# Patient Record
Sex: MALE | Race: WHITE | Employment: UNEMPLOYED | URBAN - METROPOLITAN AREA
[De-identification: names, ages, dates, MRNs, and addresses within clinical notes are randomized per-mention and may not be internally consistent; named-entity substitution may affect disease eponyms.]

---

## 2023-07-06 ENCOUNTER — OFFICE VISIT (OUTPATIENT)
Dept: PEDIATRICS | Facility: CLINIC | Age: 18
End: 2023-07-06
Payer: COMMERCIAL

## 2023-07-06 VITALS — BODY MASS INDEX: 21.32 KG/M2 | TEMPERATURE: 98 F | WEIGHT: 157.2 LBS

## 2023-07-06 DIAGNOSIS — N50.3 EPIDIDYMAL CYST: Primary | ICD-10-CM

## 2023-07-06 PROBLEM — B99.9 RECURRENT INFECTIONS: Status: ACTIVE | Noted: 2023-07-06

## 2023-07-06 PROCEDURE — 1036F TOBACCO NON-USER: CPT | Performed by: PEDIATRICS

## 2023-07-06 PROCEDURE — 99213 OFFICE O/P EST LOW 20 MIN: CPT | Performed by: PEDIATRICS

## 2023-07-06 NOTE — PROGRESS NOTES
Subjective   Patient ID: Arvin Ferrara is a 17 y.o. male who presents for Cyst (ABOVE TESTICLE THAT HAS BEEN THERE FOR AWHILE BUT IN LAST 3-4 DAYS HAS CAUSED SOME PAIN AND DISCOMFORT).  Today he is accompanied by accompanied by father.     Nearly 18-year-old boy in the office this evening with pain in the right hemiscrotum that appears to be associated with the previously identified epididymal cyst.  Patient also has a history of a grade 1 left varicocele.  There may have been an incident several days ago before the pain began where he was struck in the groin by a bounced tennis ball.  If that was the source of the difficulty the pain did not develop right away.  Now he is having discomfort particularly when he moves his leg in different positions in the upper part of the right hemiscrotum where the cyst is.  He also thinks the cyst may be a bit larger than its been in the past.  He expressed concern about this being a sign of cancer and/or negatively impacting his fertility.        Review of Systems    Objective   Temp 36.7 °C (98 °F) (Temporal)   Wt 71.3 kg (157 lb 3.2 oz)   BMI 21.32 kg/m²   BSA: 1.9 meters squared  Growth percentiles: No height on file for this encounter. 53 %ile (Z= 0.08) based on CDC (Boys, 2-20 Years) weight-for-age data using vitals from 7/6/2023.     Physical Exam  Genitourinary:     Penis: Normal.       Testes: Normal.      Comments: Examination of the right hemiscrotum reveals a 1.3 cm x 1 cm slightly tender well-circumscribed mobile mass in the upper part of the scrotum.  The overlying skin is normal.  The adjacent testis is normal.  He does have a grade 1 out of 3 left varicocele that is nontender on the left.        Assessment/Plan Arvin was in the office this evening with new onset of pain and tenderness of a pre-existing epididymal cyst.  There may have been some recent trauma leading to this discomfort.  On exam the cyst feels about 1-1/2 times the size of my previous measurement.   Because of time constraints related to return to school I recommend that the family reach out to one of our local urologist.  There is a very good practice [facility] [place] of [facility].  I recommend they contact that office to see about getting him in before he heads off to school.  In the meantime he can treat the discomfort with Tylenol or Motrin.  I recommend supportive undergarments when he is active during the day and use of boxers when he is sleeping at night.  Problem List Items Addressed This Visit    None  Visit Diagnoses       Epididymal cyst    -  Primary

## 2023-07-06 NOTE — PATIENT INSTRUCTIONS
Arvin was in the office this evening with new onset of pain and tenderness of a pre-existing epididymal cyst.  There may have been some recent trauma leading to this discomfort.  On exam the cyst feels about 1-1/2 times the size of my previous measurement.  Because of time constraints related to return to school I recommend that the family reach out to one of our local urologist.  There is a very good practice called [facility] on the [place] side [facility].  I recommend they contact that office to see about getting him in before he heads off to school.  In the meantime he can treat the discomfort with Tylenol or Motrin.  I recommend supportive undergarments when he is active during the day and use of boxers when he is sleeping at night.

## 2023-07-11 ENCOUNTER — OFFICE VISIT (OUTPATIENT)
Dept: PEDIATRICS | Facility: CLINIC | Age: 18
End: 2023-07-11
Payer: COMMERCIAL

## 2023-07-11 VITALS
HEIGHT: 72 IN | SYSTOLIC BLOOD PRESSURE: 118 MMHG | DIASTOLIC BLOOD PRESSURE: 68 MMHG | WEIGHT: 161.2 LBS | BODY MASS INDEX: 21.83 KG/M2

## 2023-07-11 DIAGNOSIS — M25.449 SWELLING OF FINGER JOINT, UNSPECIFIED LATERALITY: ICD-10-CM

## 2023-07-11 DIAGNOSIS — N50.3 EPIDIDYMAL CYST: ICD-10-CM

## 2023-07-11 DIAGNOSIS — Z23 IMMUNIZATION DUE: ICD-10-CM

## 2023-07-11 DIAGNOSIS — Z00.129 ENCOUNTER FOR ROUTINE CHILD HEALTH EXAMINATION WITHOUT ABNORMAL FINDINGS: Primary | ICD-10-CM

## 2023-07-11 PROCEDURE — 1036F TOBACCO NON-USER: CPT | Performed by: PEDIATRICS

## 2023-07-11 PROCEDURE — 96127 BRIEF EMOTIONAL/BEHAV ASSMT: CPT | Performed by: PEDIATRICS

## 2023-07-11 PROCEDURE — 3008F BODY MASS INDEX DOCD: CPT | Performed by: PEDIATRICS

## 2023-07-11 PROCEDURE — 99395 PREV VISIT EST AGE 18-39: CPT | Performed by: PEDIATRICS

## 2023-07-11 RX ORDER — LORATADINE 10 MG/1
10 TABLET ORAL DAILY
COMMUNITY

## 2023-07-11 SDOH — SOCIAL STABILITY: SOCIAL INSECURITY: CHRONIC STRESS AT HOME: 0

## 2023-07-11 SDOH — SOCIAL STABILITY: SOCIAL INSECURITY: CAREGIVER MARITAL DISCORD: 0

## 2023-07-11 ASSESSMENT — PATIENT HEALTH QUESTIONNAIRE - PHQ9
9. THOUGHTS THAT YOU WOULD BE BETTER OFF DEAD, OR OF HURTING YOURSELF: NOT AT ALL
5. POOR APPETITE OR OVEREATING: NOT AT ALL
6. FEELING BAD ABOUT YOURSELF - OR THAT YOU ARE A FAILURE OR HAVE LET YOURSELF OR YOUR FAMILY DOWN: NOT AT ALL
4. FEELING TIRED OR HAVING LITTLE ENERGY: SEVERAL DAYS
1. LITTLE INTEREST OR PLEASURE IN DOING THINGS: NOT AT ALL
8. MOVING OR SPEAKING SO SLOWLY THAT OTHER PEOPLE COULD HAVE NOTICED. OR THE OPPOSITE, BEING SO FIGETY OR RESTLESS THAT YOU HAVE BEEN MOVING AROUND A LOT MORE THAN USUAL: NOT AT ALL
3. TROUBLE FALLING OR STAYING ASLEEP: SEVERAL DAYS
SUM OF ALL RESPONSES TO PHQ QUESTIONS 1-9: 4
SUM OF ALL RESPONSES TO PHQ9 QUESTIONS 1 & 2: 1
7. TROUBLE CONCENTRATING ON THINGS, SUCH AS READING THE NEWSPAPER OR WATCHING TELEVISION: SEVERAL DAYS
2. FEELING DOWN, DEPRESSED OR HOPELESS: SEVERAL DAYS

## 2023-07-11 ASSESSMENT — ENCOUNTER SYMPTOMS: CONSTIPATION: 0

## 2023-07-11 NOTE — PATIENT INSTRUCTIONS
Arvin was in the office today for his annual checkup.  I saw him last week for what I believed to be a traumatic enlargement of a right sided epididymal cyst that now is a bit improved today.  Also noted on physical exam today is his left varicocele which seems stable over the past several years.  He has some soft tissue swelling in his left forearm that apparently is a reconfiguration of muscles in his forearm related to his fracture from several years ago.  Arvin also mentioned some intermittent small joint swelling of his hands.  Fortunately these episodes are short-lived.  I suspect it is either trauma induced or some other innocent cause.  Because of his age and because of the symptoms I am ordering some lab work to screen for inflammation related to his joints and to screen for lipids related to routine health care.  He needs no shots today.  We discussed transitioning from our office to his next doctor over the course of the next year.  I am happy to remain his primary provider at least until the end of this calendar year.

## 2023-07-11 NOTE — PROGRESS NOTES
Subjective   History was provided by the father.  Arvin Ferrara is a 17 y.o. male who is here for this well child visit.  No new concerns today.  I saw the patient last week when he presented with a painful swelling of a previously identified cyst in his right hemiscrotum.  He reports that he is having less symptoms now.  The family did reach out to urology last week after my visit but as yet do not have a scheduled appointment as there was some confusion about ordering testing before the visit.  Dad plans to reach out to that office again to clarify.  Immunization History   Administered Date(s) Administered    DTaP 2003, 02/03/2004, 04/22/2004, 04/05/2005, 10/07/2008    HPV 9-Valent 11/25/2015, 04/05/2016    HPV, Quadrivalent 09/24/2015    Hep A, ped/adol, 2 dose 07/12/2018, 06/27/2019    Hep B, Adolescent or Pediatric 2003, 2003, 12/14/2004    Hib (PRP-OMP) 2003, 02/03/2004, 12/14/2004    IPV 2003, 02/03/2004, 09/28/2004, 10/07/2008    Influenza, Unspecified 10/17/2009, 10/14/2010, 08/29/2011, 10/27/2012, 11/09/2018    Influenza, injectable, MDCK, preservative free, quadrivalent 12/13/2021    Influenza, injectable, quadrivalent 10/27/2019    Influenza, injectable, quadrivalent, preservative free 08/23/2016, 10/02/2020    Influenza, live, intranasal, quadrivalent 10/17/2014, 09/24/2015    Influenza, seasonal, injectable 12/21/2017, 10/08/2022    MMR 09/28/2004, 09/20/2005    Meningococcal B, Omv 07/26/2022, 11/25/2022    Meningococcal MCV4O 06/25/2020    Meningococcal MCV4P 10/17/2014    Novel influenza-H1N1-09, preservative-free 12/13/2009    Pfizer Purple Cap SARS-CoV-2 03/31/2021, 04/21/2021, 12/13/2021    Pfizer Sars-cov-2 Bivalent 30 mcg/0.3 mL 12/12/2022    Pneumococcal Conjugate PCV 7 2003, 02/03/2004, 09/28/2004, 12/14/2004    Tdap 10/17/2014    Varicella 04/05/2005, 08/21/2007     History of previous adverse reactions to immunizations? no  The following portions of the  patient's history were reviewed by a provider in this encounter and updated as appropriate:       Well Child Assessment:  History was provided by the father. Arvin lives with his father, mother and brother. Interval problems do not include chronic stress at home or marital discord.   Nutrition  Types of intake include cereals, cow's milk, eggs, fish, fruits, meats and vegetables.   Dental  The patient has a dental home. The patient brushes teeth regularly.   Elimination  Elimination problems do not include constipation.   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  He is doing well.  He will likely live in a duplex with his brother and a friend.   Social  The caregiver enjoys the child. After school, the child is at home with a parent.       Objective   Vitals:    07/11/23 1439   BP: 118/68   Weight: 73.1 kg (161 lb 3.2 oz)   Height: 1.829 m (6')     Growth parameters are noted and are appropriate for age.  Physical Exam  Vitals reviewed.   Constitutional:       General: He is not in acute distress.     Appearance: Normal appearance. He is well-developed. He is not ill-appearing.   HENT:      Head: Normocephalic and atraumatic.      Right Ear: Tympanic membrane, ear canal and external ear normal.      Left Ear: Tympanic membrane, ear canal and external ear normal.      Nose: Nose normal.      Mouth/Throat:      Mouth: Mucous membranes are moist.      Pharynx: Oropharynx is clear. No oropharyngeal exudate or posterior oropharyngeal erythema.   Eyes:      General: No scleral icterus.     Extraocular Movements: Extraocular movements intact.      Conjunctiva/sclera: Conjunctivae normal.      Pupils: Pupils are equal, round, and reactive to light.      Comments: Wears contacts.  Discs are sharp.   Neck:      Thyroid: No thyromegaly.      Vascular: No JVD.   Cardiovascular:      Rate and Rhythm: Normal rate and regular rhythm.      Heart sounds: Normal heart sounds.  No murmur heard.  Pulmonary:      Effort: Pulmonary effort is normal. No respiratory distress.      Breath sounds: Normal breath sounds.   Abdominal:      General: Bowel sounds are normal. There is no distension.      Palpations: Abdomen is soft. There is no mass.      Tenderness: There is no abdominal tenderness.      Hernia: No hernia is present.   Genitourinary:     Penis: Normal.       Testes: Normal.      Comments: When compared to last week the lesion in the right hemiscrotum above the testis is now on the order of about 1 cm in length.  It is not tender.  The overlying skin is normal.  He has a grade 1 varicocele on the left as he has had in the past.  No other new lesions.  Musculoskeletal:         General: No swelling or deformity. Normal range of motion.      Cervical back: Normal range of motion and neck supple.      Comments: NO SCOLIOSIS   Lymphadenopathy:      Cervical: No cervical adenopathy.   Skin:     General: Skin is warm and dry.      Findings: No rash.   Neurological:      General: No focal deficit present.      Mental Status: He is alert and oriented to person, place, and time.      Cranial Nerves: No cranial nerve deficit.      Gait: Gait normal.   Psychiatric:         Mood and Affect: Mood normal.         Behavior: Behavior normal.         Assessment/Plan   Well adolescent.Arvin was in the office today for his annual checkup.  I saw him last week for what I believed to be a traumatic enlargement of a right sided epididymal cyst that now is a bit improved today.  Also noted on physical exam today is his left varicocele which seems stable over the past several years.  He has some soft tissue swelling in his left forearm that apparently is a reconfiguration of muscles in his forearm related to his fracture from several years ago.  Arvin also mentioned some intermittent small joint swelling of his hands.  Fortunately these episodes are short-lived.  I suspect it is either trauma induced or some other  innocent cause.  Because of his age and because of the symptoms I am ordering some lab work to screen for inflammation related to his joints and to screen for lipids related to routine health care.  He needs no shots today.  We discussed transitioning from our office to his next doctor over the course of the next year.  I am happy to remain his primary provider at least until the end of this calendar year.  1. Anticipatory guidance discussed.  Gave handout on well-child issues at this age..  3. Development: appropriate for age  4. No orders of the defined types were placed in this encounter.    5. Follow-up visit in 1 year for next well child visit, or sooner as needed.

## 2023-07-11 NOTE — PROGRESS NOTES
The patient has a girlfriend.  They have been dating for over a year.  They are sexually active.  They use condoms and birth control pills for pregnancy prevention.  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.  Patient denies use of marijuana, tobacco, alcohol or other drugs.

## 2023-07-13 ENCOUNTER — LAB (OUTPATIENT)
Dept: LAB | Facility: LAB | Age: 18
End: 2023-07-13
Payer: COMMERCIAL

## 2023-07-13 DIAGNOSIS — Z00.129 ENCOUNTER FOR ROUTINE CHILD HEALTH EXAMINATION WITHOUT ABNORMAL FINDINGS: ICD-10-CM

## 2023-07-13 DIAGNOSIS — M25.449 SWELLING OF FINGER JOINT, UNSPECIFIED LATERALITY: ICD-10-CM

## 2023-07-13 LAB
BASOPHILS (10*3/UL) IN BLOOD BY AUTOMATED COUNT: 0.05 X10E9/L (ref 0–0.1)
BASOPHILS/100 LEUKOCYTES IN BLOOD BY AUTOMATED COUNT: 0.9 % (ref 0–2)
C REACTIVE PROTEIN (MG/L) IN SER/PLAS: 0.13 MG/DL
CHOLESTEROL (MG/DL) IN SER/PLAS: 113 MG/DL (ref 0–199)
CHOLESTEROL IN HDL (MG/DL) IN SER/PLAS: 44.9 MG/DL
CHOLESTEROL/HDL RATIO: 2.5
EOSINOPHILS (10*3/UL) IN BLOOD BY AUTOMATED COUNT: 0.31 X10E9/L (ref 0–0.7)
EOSINOPHILS/100 LEUKOCYTES IN BLOOD BY AUTOMATED COUNT: 5.6 % (ref 0–6)
ERYTHROCYTE DISTRIBUTION WIDTH (RATIO) BY AUTOMATED COUNT: 12.5 % (ref 11.5–14.5)
ERYTHROCYTE MEAN CORPUSCULAR HEMOGLOBIN CONCENTRATION (G/DL) BY AUTOMATED: 33.6 G/DL (ref 32–36)
ERYTHROCYTE MEAN CORPUSCULAR VOLUME (FL) BY AUTOMATED COUNT: 89 FL (ref 80–100)
ERYTHROCYTES (10*6/UL) IN BLOOD BY AUTOMATED COUNT: 5.54 X10E12/L (ref 4.5–5.9)
HEMATOCRIT (%) IN BLOOD BY AUTOMATED COUNT: 49.1 % (ref 41–52)
HEMOGLOBIN (G/DL) IN BLOOD: 16.5 G/DL (ref 13.5–17.5)
IMMATURE GRANULOCYTES/100 LEUKOCYTES IN BLOOD BY AUTOMATED COUNT: 0.2 % (ref 0–0.9)
LDL: 42 MG/DL (ref 0–109)
LEUKOCYTES (10*3/UL) IN BLOOD BY AUTOMATED COUNT: 5.5 X10E9/L (ref 4.4–11.3)
LYMPHOCYTES (10*3/UL) IN BLOOD BY AUTOMATED COUNT: 1.82 X10E9/L (ref 1.2–4.8)
LYMPHOCYTES/100 LEUKOCYTES IN BLOOD BY AUTOMATED COUNT: 33.2 % (ref 13–44)
MONOCYTES (10*3/UL) IN BLOOD BY AUTOMATED COUNT: 0.41 X10E9/L (ref 0.1–1)
MONOCYTES/100 LEUKOCYTES IN BLOOD BY AUTOMATED COUNT: 7.5 % (ref 2–10)
NEUTROPHILS (10*3/UL) IN BLOOD BY AUTOMATED COUNT: 2.89 X10E9/L (ref 1.2–7.7)
NEUTROPHILS/100 LEUKOCYTES IN BLOOD BY AUTOMATED COUNT: 52.6 % (ref 40–80)
NON HDL CHOLESTEROL: 68 MG/DL (ref 0–119)
PLATELETS (10*3/UL) IN BLOOD AUTOMATED COUNT: 257 X10E9/L (ref 150–450)
TRIGLYCERIDE (MG/DL) IN SER/PLAS: 131 MG/DL (ref 0–149)
VLDL: 26 MG/DL (ref 0–40)

## 2023-07-13 PROCEDURE — 86140 C-REACTIVE PROTEIN: CPT

## 2023-07-13 PROCEDURE — 36415 COLL VENOUS BLD VENIPUNCTURE: CPT

## 2023-07-13 PROCEDURE — 80061 LIPID PANEL: CPT

## 2023-07-13 PROCEDURE — 85025 COMPLETE CBC W/AUTO DIFF WBC: CPT

## 2023-07-14 ENCOUNTER — TELEPHONE (OUTPATIENT)
Dept: PEDIATRICS | Facility: CLINIC | Age: 18
End: 2023-07-14
Payer: COMMERCIAL

## 2023-07-14 NOTE — TELEPHONE ENCOUNTER
I called and spoke to the patient to let him know his labs are normal.  No further studies needed at this time.

## 2024-12-19 ENCOUNTER — APPOINTMENT (OUTPATIENT)
Dept: PRIMARY CARE | Facility: CLINIC | Age: 19
End: 2024-12-19
Payer: COMMERCIAL

## 2024-12-19 VITALS
BODY MASS INDEX: 22.35 KG/M2 | OXYGEN SATURATION: 98 % | SYSTOLIC BLOOD PRESSURE: 109 MMHG | WEIGHT: 165 LBS | TEMPERATURE: 97.3 F | HEART RATE: 58 BPM | HEIGHT: 72 IN | RESPIRATION RATE: 18 BRPM | DIASTOLIC BLOOD PRESSURE: 69 MMHG

## 2024-12-19 DIAGNOSIS — N43.40 SPERMATOCELE OF EPIDIDYMIS: ICD-10-CM

## 2024-12-19 DIAGNOSIS — Z23 NEED FOR DIPHTHERIA-TETANUS-PERTUSSIS (TDAP) VACCINE: ICD-10-CM

## 2024-12-19 DIAGNOSIS — Z83.72 FAMILY HISTORY OF FAMILIAL ADENOMATOUS POLYPOSIS: ICD-10-CM

## 2024-12-19 DIAGNOSIS — Z00.00 HEALTH CARE MAINTENANCE: Primary | ICD-10-CM

## 2024-12-19 LAB
POC APPEARANCE, URINE: CLEAR
POC BILIRUBIN, URINE: NEGATIVE
POC BLOOD, URINE: NEGATIVE
POC COLOR, URINE: YELLOW
POC GLUCOSE, URINE: NEGATIVE MG/DL
POC KETONES, URINE: NEGATIVE MG/DL
POC LEUKOCYTES, URINE: NEGATIVE
POC NITRITE,URINE: NEGATIVE
POC PH, URINE: 6 PH
POC PROTEIN, URINE: NEGATIVE MG/DL
POC SPECIFIC GRAVITY, URINE: 1.02
POC UROBILINOGEN, URINE: 0.2 EU/DL

## 2024-12-19 PROCEDURE — 3008F BODY MASS INDEX DOCD: CPT | Performed by: FAMILY MEDICINE

## 2024-12-19 PROCEDURE — 90715 TDAP VACCINE 7 YRS/> IM: CPT | Performed by: FAMILY MEDICINE

## 2024-12-19 PROCEDURE — 81002 URINALYSIS NONAUTO W/O SCOPE: CPT | Performed by: FAMILY MEDICINE

## 2024-12-19 PROCEDURE — 1036F TOBACCO NON-USER: CPT | Performed by: FAMILY MEDICINE

## 2024-12-19 PROCEDURE — 90471 IMMUNIZATION ADMIN: CPT | Performed by: FAMILY MEDICINE

## 2024-12-19 PROCEDURE — 99395 PREV VISIT EST AGE 18-39: CPT | Performed by: FAMILY MEDICINE

## 2024-12-19 ASSESSMENT — ENCOUNTER SYMPTOMS
SHORTNESS OF BREATH: 0
HEADACHES: 0

## 2024-12-19 ASSESSMENT — PATIENT HEALTH QUESTIONNAIRE - PHQ9
SUM OF ALL RESPONSES TO PHQ9 QUESTIONS 1 AND 2: 0
2. FEELING DOWN, DEPRESSED OR HOPELESS: NOT AT ALL
1. LITTLE INTEREST OR PLEASURE IN DOING THINGS: NOT AT ALL

## 2024-12-19 NOTE — PROGRESS NOTES
Subjective     Arvin Ferrara is a 19 y.o. male who presents for Annual Exam.    HPI     Pt is here today for annual well exam.  He is new to practice.     He feels well    His brother was recently diagnosed with Familial adenomatous polyposis.     Hx of spermatoceles bilaterally and left varicocele.  Evaluated by urology (Dr. Ferreira) in the past     Pt reports hx of anxiety/OCD.  He tried lexapro last year but didn't feel it helped.  He had more anxiety while taking it.  He sees a therpist through his college.  He reports mood is well.  Patient denies any suicidal or homicidal ideation or plan.      Pt is long term monogamous relationship.  He declined STI screening labs today.     Review of Systems   Respiratory:  Negative for shortness of breath.    Cardiovascular:  Negative for chest pain.   Neurological:  Negative for headaches.       Objective     Vitals:    12/19/24 0852   BP: 109/69   BP Location: Left arm   Patient Position: Sitting   Pulse: 58   Resp: 18   Temp: 36.3 °C (97.3 °F)   TempSrc: Temporal   SpO2: 98%   Weight: 74.8 kg (165 lb)   Height: 1.829 m (6')        Current Outpatient Medications   Medication Instructions    loratadine (CLARITIN) 10 mg, Daily        Allergies   Allergen Reactions    Tree And Shrub Pollen Runny nose        Past Surgical History:   Procedure Laterality Date    WISDOM TOOTH EXTRACTION          Social History     Tobacco Use    Smoking status: Never     Passive exposure: Never    Smokeless tobacco: Never   Vaping Use    Vaping status: Never Used   Substance Use Topics    Alcohol use: Not Currently    Drug use: Never        Family History   Problem Relation Name Age of Onset    No Known Problems Mother      No Known Problems Father      Familial Adenomatous Polyposis Brother          Immunization History   Administered Date(s) Administered    COVID-19, mRNA, LNP-S, PF, 30 mcg/0.3 mL dose 03/31/2021, 04/21/2021    DTaP vaccine, pediatric  (INFANRIX) 2003, 02/03/2004,  04/22/2004, 04/05/2005, 10/07/2008    Flu vaccine (IIV4), preservative free *Check age/dose* 08/23/2016, 10/02/2020, 02/08/2024    Flu vaccine, quadrivalent, no egg protein, age 6 month or greater (FLUCELVAX) 12/13/2021    HPV 9-valent vaccine (GARDASIL 9) 11/25/2015, 04/05/2016    HPV, Quadrivalent 09/24/2015    Hepatitis A vaccine, pediatric/adolescent (HAVRIX, VAQTA) 07/12/2018, 06/27/2019    Hepatitis B vaccine, 19 yrs and under (RECOMBIVAX, ENGERIX) 2003, 2003, 12/14/2004    HiB PRP-OMP conjugate vaccine, pediatric (PEDVAXHIB) 2003, 02/03/2004, 12/14/2004    Influenza, Unspecified 10/17/2009, 10/14/2010, 08/29/2011, 10/27/2012, 11/09/2018    Influenza, injectable, quadrivalent 10/27/2019    Influenza, live, intranasal, quadrivalent 10/17/2014, 09/24/2015    Influenza, seasonal, injectable 12/21/2017, 10/08/2022    MMR vaccine, subcutaneous (MMR II) 09/28/2004, 09/20/2005    Meningococcal ACWY vaccine (MENVEO) 06/25/2020    Meningococcal ACWY-D (Menactra) 4-valent conjugate vaccine 10/17/2014    Meningococcal B vaccine (BEXSERO) 07/26/2022, 11/25/2022    Novel influenza-H1N1-09, preservative-free 12/13/2009    Pfizer COVID-19 vaccine, bivalent, age 12 years and older (30 mcg/0.3 mL) 12/12/2022    Pfizer Purple Cap SARS-CoV-2 12/13/2021    Pneumococcal Conjugate PCV 7 2003, 02/03/2004, 09/28/2004, 12/14/2004    Poliovirus vaccine, subcutaneous (IPOL) 2003, 02/03/2004, 09/28/2004, 10/07/2008    Tdap vaccine, age 7 year and older (BOOSTRIX, ADACEL) 10/17/2014, 12/19/2024    Varicella vaccine, subcutaneous (VARIVAX) 04/05/2005, 08/21/2007        Physical Exam  Vitals reviewed.   Constitutional:       General: He is not in acute distress.     Appearance: Normal appearance.   HENT:      Head: Normocephalic and atraumatic.      Right Ear: Tympanic membrane, ear canal and external ear normal.      Left Ear: Tympanic membrane, ear canal and external ear normal.      Mouth/Throat:      Mouth:  Mucous membranes are moist.      Pharynx: Oropharynx is clear. No oropharyngeal exudate or posterior oropharyngeal erythema.   Eyes:      Extraocular Movements: Extraocular movements intact.      Pupils: Pupils are equal, round, and reactive to light.   Neck:      Thyroid: No thyroid mass or thyromegaly.   Cardiovascular:      Rate and Rhythm: Normal rate and regular rhythm.      Heart sounds: No murmur heard.  Pulmonary:      Effort: Pulmonary effort is normal. No respiratory distress.      Breath sounds: Normal breath sounds. No wheezing, rhonchi or rales.   Abdominal:      General: Abdomen is flat. There is no distension.      Palpations: Abdomen is soft.      Tenderness: There is no abdominal tenderness.   Genitourinary:     Testes: Normal.      Comments: Not able to palpate any cyst or varicocele today on exam.   Musculoskeletal:         General: Normal range of motion.   Lymphadenopathy:      Cervical: No cervical adenopathy.   Skin:     General: Skin is warm and dry.      Findings: No rash.   Neurological:      Mental Status: He is alert and oriented to person, place, and time. Mental status is at baseline.   Psychiatric:         Mood and Affect: Mood normal.         Behavior: Behavior normal.         Assessment & Plan  Health care maintenance  Well Exam - new patient    Vaccines - flu vaccine utd    Tdap given today     I recommend yearly flu vaccine and routine COVID vaccinations as indicated     Complete labs    Healthy diet, routine exercise was discussed with patient      Orders:    POCT UA (nonautomated) manually resulted    Comprehensive Metabolic Panel; Future    Lipid Panel; Future    CBC and Auto Differential; Future    Family history of familial adenomatous polyposis  Brother. I will refer patient to GI.    Orders:    Referral to Gastroenterology; Future    Need for diphtheria-tetanus-pertussis (Tdap) vaccine    Orders:    Tdap vaccine, age 7 years and older    Spermatocele of epididymis  Evaluated  by Dr. Bolton in the past.

## 2024-12-20 ENCOUNTER — LAB (OUTPATIENT)
Dept: LAB | Facility: LAB | Age: 19
End: 2024-12-20
Payer: COMMERCIAL

## 2024-12-20 DIAGNOSIS — Z00.00 HEALTH CARE MAINTENANCE: ICD-10-CM

## 2024-12-20 LAB
ALBUMIN SERPL BCP-MCNC: 5 G/DL (ref 3.4–5)
ALP SERPL-CCNC: 44 U/L (ref 33–120)
ALT SERPL W P-5'-P-CCNC: 13 U/L (ref 10–52)
ANION GAP SERPL CALC-SCNC: 14 MMOL/L (ref 10–20)
AST SERPL W P-5'-P-CCNC: 21 U/L (ref 9–39)
BASOPHILS # BLD AUTO: 0.04 X10*3/UL (ref 0–0.1)
BASOPHILS NFR BLD AUTO: 0.6 %
BILIRUB SERPL-MCNC: 0.8 MG/DL (ref 0–1.2)
BUN SERPL-MCNC: 11 MG/DL (ref 6–23)
CALCIUM SERPL-MCNC: 9.8 MG/DL (ref 8.6–10.6)
CHLORIDE SERPL-SCNC: 101 MMOL/L (ref 98–107)
CHOLEST SERPL-MCNC: 120 MG/DL (ref 0–199)
CHOLESTEROL/HDL RATIO: 2.6
CO2 SERPL-SCNC: 30 MMOL/L (ref 21–32)
CREAT SERPL-MCNC: 0.88 MG/DL (ref 0.5–1.3)
EGFRCR SERPLBLD CKD-EPI 2021: >90 ML/MIN/1.73M*2
EOSINOPHIL # BLD AUTO: 0.32 X10*3/UL (ref 0–0.7)
EOSINOPHIL NFR BLD AUTO: 4.4 %
ERYTHROCYTE [DISTWIDTH] IN BLOOD BY AUTOMATED COUNT: 11.9 % (ref 11.5–14.5)
GLUCOSE SERPL-MCNC: 81 MG/DL (ref 74–99)
HCT VFR BLD AUTO: 49.3 % (ref 41–52)
HDLC SERPL-MCNC: 46.8 MG/DL
HGB BLD-MCNC: 16.6 G/DL (ref 13.5–17.5)
IMM GRANULOCYTES # BLD AUTO: 0.02 X10*3/UL (ref 0–0.7)
IMM GRANULOCYTES NFR BLD AUTO: 0.3 % (ref 0–0.9)
LDLC SERPL CALC-MCNC: 51 MG/DL
LYMPHOCYTES # BLD AUTO: 1.97 X10*3/UL (ref 1.2–4.8)
LYMPHOCYTES NFR BLD AUTO: 27.3 %
MCH RBC QN AUTO: 29.4 PG (ref 26–34)
MCHC RBC AUTO-ENTMCNC: 33.7 G/DL (ref 32–36)
MCV RBC AUTO: 87 FL (ref 80–100)
MONOCYTES # BLD AUTO: 0.57 X10*3/UL (ref 0.1–1)
MONOCYTES NFR BLD AUTO: 7.9 %
NEUTROPHILS # BLD AUTO: 4.3 X10*3/UL (ref 1.2–7.7)
NEUTROPHILS NFR BLD AUTO: 59.5 %
NON HDL CHOLESTEROL: 73 MG/DL (ref 0–149)
NRBC BLD-RTO: 0 /100 WBCS (ref 0–0)
PLATELET # BLD AUTO: 255 X10*3/UL (ref 150–450)
POTASSIUM SERPL-SCNC: 4 MMOL/L (ref 3.5–5.3)
PROT SERPL-MCNC: 7.8 G/DL (ref 6.4–8.2)
RBC # BLD AUTO: 5.64 X10*6/UL (ref 4.5–5.9)
SODIUM SERPL-SCNC: 141 MMOL/L (ref 136–145)
TRIGL SERPL-MCNC: 111 MG/DL (ref 0–114)
VLDL: 22 MG/DL (ref 0–40)
WBC # BLD AUTO: 7.2 X10*3/UL (ref 4.4–11.3)

## 2024-12-20 PROCEDURE — 36415 COLL VENOUS BLD VENIPUNCTURE: CPT

## 2024-12-20 PROCEDURE — 80053 COMPREHEN METABOLIC PANEL: CPT

## 2024-12-20 PROCEDURE — 85025 COMPLETE CBC W/AUTO DIFF WBC: CPT

## 2024-12-20 PROCEDURE — 80061 LIPID PANEL: CPT

## 2025-12-22 ENCOUNTER — APPOINTMENT (OUTPATIENT)
Facility: CLINIC | Age: 20
End: 2025-12-22
Payer: COMMERCIAL